# Patient Record
Sex: FEMALE | URBAN - METROPOLITAN AREA
[De-identification: names, ages, dates, MRNs, and addresses within clinical notes are randomized per-mention and may not be internally consistent; named-entity substitution may affect disease eponyms.]

---

## 2024-11-25 ENCOUNTER — OFFICE VISIT (OUTPATIENT)
Dept: URGENT CARE | Age: 29
End: 2024-11-25
Payer: COMMERCIAL

## 2024-11-25 VITALS
WEIGHT: 165 LBS | OXYGEN SATURATION: 99 % | HEIGHT: 67 IN | BODY MASS INDEX: 25.9 KG/M2 | TEMPERATURE: 99 F | SYSTOLIC BLOOD PRESSURE: 126 MMHG | HEART RATE: 99 BPM | DIASTOLIC BLOOD PRESSURE: 82 MMHG | RESPIRATION RATE: 18 BRPM

## 2024-11-25 DIAGNOSIS — R50.83 POST-VACCINATION FEVER: Primary | ICD-10-CM

## 2024-11-25 DIAGNOSIS — J06.9 UPPER RESPIRATORY TRACT INFECTION, UNSPECIFIED TYPE: ICD-10-CM

## 2024-11-25 DIAGNOSIS — Z20.822 LAB TEST NEGATIVE FOR COVID-19 VIRUS: ICD-10-CM

## 2024-11-25 DIAGNOSIS — R05.1 ACUTE COUGH: ICD-10-CM

## 2024-11-25 LAB
POC RAPID INFLUENZA A: NEGATIVE
POC RAPID INFLUENZA B: NEGATIVE
POC SARS-COV-2 AG BINAX: NORMAL

## 2024-11-25 PROCEDURE — 99203 OFFICE O/P NEW LOW 30 MIN: CPT | Performed by: PHYSICIAN ASSISTANT

## 2024-11-25 PROCEDURE — 87804 INFLUENZA ASSAY W/OPTIC: CPT | Performed by: PHYSICIAN ASSISTANT

## 2024-11-25 PROCEDURE — 1036F TOBACCO NON-USER: CPT | Performed by: PHYSICIAN ASSISTANT

## 2024-11-25 PROCEDURE — 3008F BODY MASS INDEX DOCD: CPT | Performed by: PHYSICIAN ASSISTANT

## 2024-11-25 PROCEDURE — 87811 SARS-COV-2 COVID19 W/OPTIC: CPT | Performed by: PHYSICIAN ASSISTANT

## 2024-11-25 RX ORDER — ACETAMINOPHEN 500 MG
1000 TABLET ORAL ONCE
Status: COMPLETED | OUTPATIENT
Start: 2024-11-25 | End: 2024-11-25

## 2024-11-25 ASSESSMENT — ENCOUNTER SYMPTOMS
VOMITING: 0
PALPITATIONS: 0
SORE THROAT: 1
FEVER: 1
NAUSEA: 0
SHORTNESS OF BREATH: 0
WHEEZING: 0
COUGH: 1
FATIGUE: 1

## 2024-11-25 ASSESSMENT — PAIN SCALES - GENERAL: PAINLEVEL_OUTOF10: 7

## 2024-11-25 NOTE — PROGRESS NOTES
"Subjective   Patient ID: Lisa Smith is a 29 y.o. female. They present today with a chief complaint of Illness (Pt states that she took the covid and flu shot Saturday and now she has a sore throat, pain when swallowing and chest burns when she coughs.).    History of Present Illness  -c/o fever, sore throat, and cough which started on Saturday  -received COVID and flu vaccines on Saturday  -symptoms similar to when had covid in the past  -denies CP, SOB, palpitations, history of DVT/PE          Past Medical History  Allergies as of 11/25/2024    (No Known Allergies)       (Not in a hospital admission)       No past medical history on file.    No past surgical history on file.     reports that she has never smoked. She has never used smokeless tobacco.    Review of Systems  Review of Systems   Constitutional:  Positive for fatigue and fever.   HENT:  Positive for congestion, postnasal drip and sore throat.    Respiratory:  Positive for cough. Negative for shortness of breath and wheezing.    Cardiovascular:  Negative for chest pain, palpitations and leg swelling.   Gastrointestinal:  Negative for nausea and vomiting.   All other systems reviewed and are negative.      Objective    Vitals:    11/25/24 1408 11/25/24 1414   BP: 126/82    BP Location: Right arm    Patient Position: Sitting    BP Cuff Size: Large adult    Pulse: 104 99   Resp: 18    Temp: (!) 38.3 °C (101 °F) 37.2 °C (99 °F)   TempSrc: Oral Oral   SpO2: 99%    Weight: 74.8 kg (165 lb)    Height: 1.702 m (5' 7\")      No LMP recorded.    Physical Exam  /82 (BP Location: Right arm, Patient Position: Sitting, BP Cuff Size: Large adult)   Pulse 99   Temp 37.2 °C (99 °F) (Oral)   Resp 18   Ht 1.702 m (5' 7\")   Wt 74.8 kg (165 lb)   SpO2 99%   BMI 25.84 kg/m²   GEN: Alert, cooperative, NAD, Vitals Reviewed.   ENT: Bilateral TMs injected but without erythema or bulging., b/l ear canals unremarkable, + nasal congestion. No Sinus " tenderness. Oropharyngeal erythema, b/l tonsils hyperemic but without exudate.  + post nasal drip.  Uvula midline. No uvula swelling.  No Cervical LAD.  RESP: Unlabored, no increased WOB, no accessory muscle use, no wheezing, rhonchi, or rales appreciated.   CARDS: Mild sinus tachycardia.  NO m/r/g  EXTREMITIES: WWP, no peripheral edema, no calf tenderness.    Procedures    Point of Care Test & Imaging Results from this visit  Results for orders placed or performed in visit on 11/25/24   POCT Covid-19 Rapid Antigen   Result Value Ref Range    POC LONNIE-COV-2 AG  Presumptive negative test for SARS-CoV-2 (no antigen detected)     Presumptive negative test for SARS-CoV-2 (no antigen detected)   POCT Influenza A/B manually resulted   Result Value Ref Range    POC Rapid Influenza A Negative Negative    POC Rapid Influenza B Negative Negative      No results found.    Diagnostic study results (if any) were reviewed by Melisa Low PA-C.    Assessment/Plan   Allergies, medications, history, and pertinent labs/EKGs/Imaging reviewed by Melisa Low PA-C.     Medical Decision Making  Clinical presentation consistent with viral syndrome/uri.  Rapid COVID negative.  Rapid Flu negative.  Rapid strep negative. Patient febrile and tachycardic, which improved after tylenol..  No evidence of strep, pneumonia, otitis, bacterial sinusitis, other bacterial etiology, or sepsis.  Lack of antibiotic effectiveness discussed with patient/guardian.  Goal is symptom management with push fluids, rest, gargle warm salt water, use vaporizer or mist prn, apply heat to sinuses prn, and return office visit prn if symptoms persist or worsen anti-histamines, anti-tussives, anti-mucolytics, and/or flonase etc..    ER precautions advised.     At time of discharge patient was clinically well-appearing and HDS for outpatient management. The patient and/or family was educated regarding diagnosis, supportive care, OTC and Rx medications. The patient  and/or family was given the opportunity to ask questions prior to discharge.  They verbalized understanding of my discussion of the plans for treatment, expected course, indications to return to  or seek further evaluation in ED, and the need for timely follow up as directed.  They were provided with a work/school excuse if requested.      Orders and Diagnoses  Diagnoses and all orders for this visit:  Post-vaccination fever  -     acetaminophen (Tylenol) tablet 1,000 mg  -     POCT Covid-19 Rapid Antigen  -     POCT Influenza A/B manually resulted  Upper respiratory tract infection, unspecified type  Lab test negative for COVID-19 virus  Acute cough  -     POCT Covid-19 Rapid Antigen  -     POCT Influenza A/B manually resulted      Medical Admin Record  Administrations This Visit       acetaminophen (Tylenol) tablet 1,000 mg       Admin Date  11/25/2024 Action  Given Dose  1,000 mg Route  oral Documented By  Jennifer Kitchen MA                    Patient disposition: Home    Electronically signed by Melisa Low PA-C  9:08 PM

## 2024-11-25 NOTE — PATIENT INSTRUCTIONS
Your symptoms are most likely caused by a virus and antibiotics will not help.  Goal would be to manage symptoms. Please see the tricks below to help you manage your symptoms.      Tricks to manage your symptoms:   -Drink plenty of water to stay hydrated.  -Get plenty of rest.   -Ibuprofen (Advil® or Motrin®) or acetaminophen (Tylenol®) may be used for temperature and/or discomfort.  Do not exceed the recommended daily amount as written on the bottle. Do not take ibuprofen or other NSAIDS if you are on a blood thinner.  Please contact your provider.  -You can take a daily non-drowsy allergy medication such as Allergra (Fexofendadine), Zyrtec (Cetirizine) or Claritin (Loratadine) to decrease sinus and nasal inflammation and drainage.  -If you have elevated blood pressure you should avoid products with products that contain phenylephrine or pseudophedrine as these medications can increase your blood pressure.  -Putting a small amount of honey in tea may help with your sore throat and cough.  -Gargle with warm salt water 3 to 4 times each day. Mix 1/2 teaspoon (2.5 grams) salt with a cup (240 mL) of warm water.     -Nasal steroids spray (fluticasone or generic equivalent) 1-2 times daily as needed  -Saline nasal spray  -You can take OTC mucinex (Guaifenesin): Can take up to 1200 mg twice daily.    -Drink plenty of water. Water helps thin the mucus so your sinuses can drain more easily.   -Use a humidifier.  -inhale steam 3 to 4 times a day (for example, sit in the bathroom with the shower running).  -Apply a warm, moist washcloth to your face 3 to 4 times a day, or as directed by your caregiver.    Please seek immediate medical evaluation if you develop:   -Shortness of breath or difficulty breathing  -Trouble swallowing, trouble breathing, or shortness of breath while lying down  -You are unable to take a deep breath  -You have difficulties swallowing  -You have chest pain   -You have heart palpitations  -You have  fever > 102 F despite fever reducing medications   -You are unable to tolerate fluids for 6 hours or more  -You develop swelling and/or pain in your legs   -You feel faint, lightheaded, or dizzy  -Any other concerning symptoms    -If your symptoms are not improving over the next 36 hours, please return to the clinic or see your PCP. If your symptoms worsen or you develop shortness of breath, seek emergency care immediately.

## 2024-11-25 NOTE — LETTER
November 25, 2024     Patient: Lisa Smith   YOB: 1995   Date of Visit: 11/25/2024       To Whom It May Concern:    Lisa Smith was seen in my clinic on 11/25/2024 at 12:30 pm. Please excuse Lisa for her absence from work on this day to make the appointment. Lisa Smith is able to return to work 11/29/2024.    If you have any questions or concerns, please don't hesitate to call.         Sincerely,         Melisa Low PA-C        CC: No Recipients

## 2024-11-26 ENCOUNTER — OFFICE VISIT (OUTPATIENT)
Dept: URGENT CARE | Age: 29
End: 2024-11-26
Payer: COMMERCIAL

## 2024-11-26 VITALS
BODY MASS INDEX: 25.9 KG/M2 | DIASTOLIC BLOOD PRESSURE: 83 MMHG | HEIGHT: 67 IN | HEART RATE: 102 BPM | WEIGHT: 165 LBS | TEMPERATURE: 99.9 F | OXYGEN SATURATION: 96 % | SYSTOLIC BLOOD PRESSURE: 120 MMHG | RESPIRATION RATE: 16 BRPM

## 2024-11-26 DIAGNOSIS — J02.9 SORE THROAT: ICD-10-CM

## 2024-11-26 DIAGNOSIS — R50.9 FEVER, UNSPECIFIED FEVER CAUSE: ICD-10-CM

## 2024-11-26 DIAGNOSIS — J06.9 UPPER RESPIRATORY TRACT INFECTION, UNSPECIFIED TYPE: Primary | ICD-10-CM

## 2024-11-26 DIAGNOSIS — R05.1 ACUTE COUGH: ICD-10-CM

## 2024-11-26 LAB — POC RAPID STREP: NEGATIVE

## 2024-11-26 RX ORDER — AZITHROMYCIN 250 MG/1
TABLET, FILM COATED ORAL
Qty: 6 TABLET | Refills: 0 | Status: SHIPPED | OUTPATIENT
Start: 2024-11-26 | End: 2024-12-01

## 2024-11-26 RX ORDER — PREDNISONE 20 MG/1
40 TABLET ORAL DAILY
Qty: 10 TABLET | Refills: 0 | Status: SHIPPED | OUTPATIENT
Start: 2024-11-26 | End: 2024-12-01

## 2024-11-26 RX ORDER — ACETAMINOPHEN 325 MG/1
TABLET ORAL EVERY 6 HOURS PRN
COMMUNITY

## 2024-11-26 ASSESSMENT — PAIN SCALES - GENERAL: PAINLEVEL_OUTOF10: 10-WORST PAIN EVER

## 2024-11-26 NOTE — PATIENT INSTRUCTIONS
You were seen for cold like symptoms.  You most likely have a upper respiratory tract infection.  I recommend supportive therapy with the following medications below.    URI  1.  Please take antibiotic as prescribed  2.  Use Tea and honey for cough. This is known to work better than most OTC medications.  3.  Use Mucinex to break up congestion.   4.  Stay well-hydrated and drink lots of fluids.  5.  Follow up with your primary care physician in the next few days for reevaluation, especially if symptoms are not improving  6.  Return to the urgent care or emergency department if symptoms worsen or new symptoms develop.    Based on clinical exam findings and history you most likely have a upper respiratory tract infection.  Your initial illness was likely caused by a virus that progressed to a secondary bacterial infection.  You are contagious as soon as the symptoms start.  Your symptoms may persist up to 2-3 weeks.  Symptoms usually peak by days 3 or 5.  Typical symptoms include nasal congestion, a runny nose, scratchy throat, cough, and irritability. The diagnosis is based on symptoms. Good hand hygiene is the best way to prevent these infections, and routine vaccination can help prevent influenza. Treatment aims to relieve symptoms. Rest and fluids. Tylenol and/or ibuprofen for fever and pain. Over the counter decongestants or mucolytics.

## 2024-11-26 NOTE — PROGRESS NOTES
"Subjective   Patient ID: Lisa Smith \"Velma\" is a 29 y.o. female. They present today with a chief complaint of Sore Throat (Pt received covid and flu shot on 11/23/24. Pt started getting sore throat that evening), Cough (Productive cough, \"chest burns\". Pt had negative covid and flu testing yesterday), and Fever.    CC: URI symptoms    HPI: Patient presenting for URI symptoms that started approximately 5 days ago..  Symptoms include sore throat, runny nose, congestion, and semiproductive cough.  Cough is described as a burning sensation.  No fever, chills, myalgias, abdominal pain, nausea, vomiting, diarrhea, rash.  No chest pain or shortness of breath.  No history of clots or clotting disorders.  No lower extremity swelling edema or pain.  No recent travel or surgeries.    Past Medical History  Allergies as of 11/26/2024    (No Known Allergies)       (Not in a hospital admission)      History reviewed. No pertinent past medical history.    History reviewed. No pertinent surgical history.     reports that she has never smoked. She has never used smokeless tobacco.    Review of Systems  Review of Systems    After reviewing all body systems I have documented pertinent findings above in the history.  All other Systems reviewed and are negative for complaint.  Pertinent positive and negatives are listed in the above HPI.    Objective    Vitals:    11/26/24 0841   BP: 120/83   BP Location: Right arm   Patient Position: Sitting   BP Cuff Size: Large adult   Pulse: 102   Resp: 16   Temp: 37.7 °C (99.9 °F)   TempSrc: Oral   SpO2: 96%   Weight: 74.8 kg (165 lb)   Height: 1.702 m (5' 7\")     Patient's last menstrual period was 10/26/2024 (exact date).  Physical Exam    General: Alert, oriented, and cooperative.  No acute distress. Well developed, well nourished.     Skin: Skin is warm, and dry. No rashes or lesions.    Eyes: Sclera and conjunctivae normal     Ears: TM´s are intact, pink/grey, with mild " effusions bilaterally.  No significant erythema.  No hemotympanum or rupture. Bilateral auricle, helix, and tragus without inflammation or erythema.  No mastoid erythema, or tenderness.  No deformities. Right and left canal negative for erythema, or discharge.  Hearing is grossly intact bilaterally    Mouth/Throat: Pharynx is erythematous.  Tonsils are 2+, equal in size.  No exudates.  No angioedema of the lips or tongue.  No respiratory compromise, tripoding, drooling, muffled voice,  stridor, or trismus.     Neck: Supple.     Cardiac: Regular rate     Respiratory:  No acute respiratory distress.  Regular rate of breathing.  No accessory muscle use.  No tripoding.  Lungs are clear bilaterally.    Abdomen: Soft, nontender.    Musculoskeletal: Upper and lower extremities are atraumatic in appearance without deformity, erythema, edema, and atrophy. No crepitus, or tenderness. Compartments are all soft.    Procedures    Point of Care Test & Imaging Results from this visit  Results for orders placed or performed in visit on 11/26/24   POCT rapid strep A manually resulted    Collection Time: 11/26/24  8:57 AM   Result Value Ref Range    POC Rapid Strep Negative Negative     No results found.    Diagnostic study results (if any) were reviewed by Wellington Kamara PA-C.    Assessment/Plan   Allergies, medications, history, and pertinent labs/EKGs/Imaging reviewed by Wellington Kamara PA-C.     MDM:  Patient presenting for URI type symptoms x 5 days.  No relief despite taking over-the-counter medications.  Patient overall looks well.  Speaks in complete sentences.  No evidence of acute distress or respiratory compromise.  No tripoding. No nasal flaring. No clinical signs or history to suggest meningitis, Dario´s, peritonsillar abscess, epiglottitis, pneumonia, or asthma.  Due length and worsening of  symptoms a bacterial respiratory tract infection is considered the most likely cause.  Through shared decision making the  patient was prescribed an antimicrobial as a treatment measure.  Advised supportive therapy with over the counter medication and follow-up with a PCP in the next few days. Pt/family instructed to return to the urgent care or emergency department if symptoms worsen or if new symptoms develop. Patient/family expressed understanding and consented to the above plan. No barriers of communication were apparent.      Orders and Diagnoses  Diagnoses and all orders for this visit:  Upper respiratory tract infection, unspecified type  -     azithromycin (Zithromax) 250 mg tablet; Take 2 tabs (500 mg) by mouth today, than 1 daily for 4 days.  -     predniSONE (Deltasone) 20 mg tablet; Take 2 tablets (40 mg) by mouth once daily for 5 days.  Sore throat  -     POCT rapid strep A manually resulted  -     azithromycin (Zithromax) 250 mg tablet; Take 2 tabs (500 mg) by mouth today, than 1 daily for 4 days.  -     predniSONE (Deltasone) 20 mg tablet; Take 2 tablets (40 mg) by mouth once daily for 5 days.  Fever, unspecified fever cause  Acute cough      Medical Admin Record      Patient disposition: Home    Electronically signed by Wellington Kamara PA-C  9:06 AM